# Patient Record
Sex: MALE | Race: WHITE | Employment: FULL TIME | URBAN - METROPOLITAN AREA
[De-identification: names, ages, dates, MRNs, and addresses within clinical notes are randomized per-mention and may not be internally consistent; named-entity substitution may affect disease eponyms.]

---

## 2019-11-14 ENCOUNTER — OFFICE VISIT (OUTPATIENT)
Dept: INTERNAL MEDICINE CLINIC | Age: 24
End: 2019-11-14

## 2019-11-14 VITALS
HEART RATE: 81 BPM | OXYGEN SATURATION: 99 % | HEIGHT: 67 IN | BODY MASS INDEX: 24.86 KG/M2 | DIASTOLIC BLOOD PRESSURE: 77 MMHG | TEMPERATURE: 97.9 F | WEIGHT: 158.4 LBS | SYSTOLIC BLOOD PRESSURE: 115 MMHG | RESPIRATION RATE: 18 BRPM

## 2019-11-14 DIAGNOSIS — R07.9 CHEST PAIN, UNSPECIFIED TYPE: Primary | ICD-10-CM

## 2019-11-14 DIAGNOSIS — M54.9 BACK PAIN, UNSPECIFIED BACK LOCATION, UNSPECIFIED BACK PAIN LATERALITY, UNSPECIFIED CHRONICITY: ICD-10-CM

## 2019-11-14 DIAGNOSIS — M79.10 MYALGIA: ICD-10-CM

## 2019-11-14 RX ORDER — IBUPROFEN 600 MG/1
600 TABLET ORAL
Qty: 20 TAB | Refills: 0 | Status: SHIPPED | OUTPATIENT
Start: 2019-11-14

## 2019-11-14 NOTE — PROGRESS NOTES
Chief Complaint   Patient presents with    Anxiety    Chest Pain     1. Have you been to the ER, urgent care clinic since your last visit? Hospitalized since your last visit? No    2. Have you seen or consulted any other health care providers outside of the [address] [place] [address] since your last visit? Include any pap smears or colon screening.  No

## 2019-11-14 NOTE — PROGRESS NOTES
[facility]  Manoj Jennings MD  [address] [address]    Chief Complaint   Patient presents with    Anxiety    Chest Pain       SUBJECTIVE:    Juancho Whitt is a [employer] y.o. male with PMH of Asperger's syndrome, ADHD, depression and acne vulgaris in with mother for chest pain evaluation. He is a new patient. Obtaining history is a challenge but for less than 1 week pt has had anterior chest pain, vague in description but non- exertional, no associated dyspnea, diaphoresis or nausea. Also has nonspecific upper and lower back pain. Denies fatigue, weakness or paresthesia. . No injury history. Omeprazole did not relieve. Has not tried other treatment. Endorses a high satuared fat diet. Works at [employer] and pushing heavy objects all day. Psych: Nicholas Crystal          Current Outpatient Medications   Medication Sig Dispense Refill    bupropion HCl (WELLBUTRIN PO) Take  by mouth.          Past Medical History:   Diagnosis Date    Asperger's syndrome      History reviewed. No pertinent surgical history.   No Known Allergies    REVIEW OF SYSTEMS:  General: negative for - chills or fever  ENT: negative for - headaches, nasal congestion or tinnitus  Respiratory: negative for - cough, hemoptysis, shortness of breath or wheezing  Cardiovascular : + chest pain; negative for -  edema, palpitations or shortness of breath  Gastrointestinal: negative for - abdominal pain, blood in stools, heartburn or nausea/vomiting  Genito-Urinary: no dysuria, trouble voiding, or hematuria  Musculoskeletal: negative for - gait disturbance, joint pain, joint stiffness or joint swelling  Neurological: no TIA or stroke symptoms  Hematologic: no bruises, no bleeding, no swollen glands  Integument: no lumps, mole changes, nail changes or rash  Endocrine:no malaise/lethargy or unexpected weight changes      Social History     Socioeconomic History    Marital status: SINGLE     Spouse name: Not on file    Number of children: Not on file    Years of education: Not on file    Highest education level: Not on file   Tobacco Use    Smoking status: Never Smoker    Smokeless tobacco: Never Used   Substance and Sexual Activity    Alcohol use: Never     Frequency: Never    Drug use: Never     History reviewed. No pertinent family history. OBJECTIVE:     Visit Vitals  /77   Pulse 81   Temp 97.9 °F (36.6 °C) (Oral)   Resp 18   Ht 5' 7\" (1.702 m)   Wt 158 lb 6.4 oz (71.8 kg)   SpO2 99%   BMI 24.81 kg/m²     CONSTITUTIONAL: well developed, well nourished, appears age appropriate  EYES: perrla, eom intact  ENMT:moist mucous membranes, pharynx clear  NECK: supple. Thyroid normal  RESPIRATORY: Chest: clear bilaterally  CARDIOVASCULAR: Heart: regular rate and rhythm; no chest wall tenderness  GASTROINTESTINAL: Abdomen: soft, bowel sounds active  HEMATOLOGIC: no pathological lymph nodes palpated  MUSCULOSKELETAL: Back: no spinal or paraspinal tenderness. ROM is full at neck and extremities. Extremities: no edema, pulse 1+   INTEGUMENT: Facial comedonesl. NEUROLOGIC: non-focal exam SLR test negative DTR intact  MENTAL STATUS: alert and oriented, appropriate affect     Office Visit on 11/14/2019   Component Date Value Ref Range Status    WBC 11/14/2019 7.9  3.4 - 10.8 x10E3/uL Final    RBC 11/14/2019 5.38  4.14 - 5.80 x10E6/uL Final    HGB 11/14/2019 16.0  13.0 - 17.7 g/dL Final    HCT 11/14/2019 46.6  37.5 - 51.0 % Final    MCV 11/14/2019 87  79 - 97 fL Final    MCH 11/14/2019 29.7  26.6 - 33.0 pg Final    MCHC 11/14/2019 34.3  31.5 - 35.7 g/dL Final    RDW 11/14/2019 14.0  12.3 - 15.4 % Final    PLATELET 64/22/8118 671  150 - 450 x10E3/uL Final    NEUTROPHILS 11/14/2019 77  Not Estab. % Final    Lymphocytes 11/14/2019 14  Not Estab. % Final    MONOCYTES 11/14/2019 7  Not Estab. % Final    EOSINOPHILS 11/14/2019 2  Not Estab. % Final    BASOPHILS 11/14/2019 0  Not Estab. % Final    ABS.  NEUTROPHILS 11/14/2019 6.1  1.4 - 7.0 x10E3/uL Final    Abs Lymphocytes 11/14/2019 1.1  0.7 - 3.1 x10E3/uL Final    ABS. MONOCYTES 11/14/2019 0.5  0.1 - 0.9 x10E3/uL Final    ABS. EOSINOPHILS 11/14/2019 0.2  0.0 - 0.4 x10E3/uL Final    ABS. BASOPHILS 11/14/2019 0.0  0.0 - 0.2 x10E3/uL Final    IMMATURE GRANULOCYTES 11/14/2019 0  Not Estab. % Final    ABS. IMM. GRANS. 11/14/2019 0.0  0.0 - 0.1 x10E3/uL Final    Glucose 11/14/2019 79  65 - 99 mg/dL Final    BUN 11/14/2019 15  6 - 20 mg/dL Final    Creatinine 11/14/2019 1.13  0.76 - 1.27 mg/dL Final    GFR est non-AA 11/14/2019 90  >59 mL/min/1.73 Final    GFR est AA 11/14/2019 105  >59 mL/min/1.73 Final    BUN/Creatinine ratio 11/14/2019 13  9 - 20 Final    Sodium 11/14/2019 142  134 - 144 mmol/L Final    Potassium 11/14/2019 4.7  3.5 - 5.2 mmol/L Final    Chloride 11/14/2019 100  96 - 106 mmol/L Final    CO2 11/14/2019 21  20 - 29 mmol/L Final    Calcium 11/14/2019 10.0  8.7 - 10.2 mg/dL Final    Protein, total 11/14/2019 7.3  6.0 - 8.5 g/dL Final    Albumin 11/14/2019 5.0  3.5 - 5.5 g/dL Final    GLOBULIN, TOTAL 11/14/2019 2.3  1.5 - 4.5 g/dL Final    A-G Ratio 11/14/2019 2.2  1.2 - 2.2 Final    Bilirubin, total 11/14/2019 1.7* 0.0 - 1.2 mg/dL Final    Alk.  phosphatase 11/14/2019 76  39 - 117 IU/L Final    AST (SGOT) 11/14/2019 21  0 - 40 IU/L Final    ALT (SGPT) 11/14/2019 15  0 - 44 IU/L Final    TSH 11/14/2019 2.390  0.450 - 4.500 uIU/mL Final    Cholesterol, total 11/14/2019 161  100 - 199 mg/dL Final    Triglyceride 11/14/2019 56  0 - 149 mg/dL Final    HDL Cholesterol 11/14/2019 49  >39 mg/dL Final    VLDL, calculated 11/14/2019 11  5 - 40 mg/dL Final    LDL, calculated 11/14/2019 101* 0 - 99 mg/dL Final    Specific Gravity 11/14/2019 1.023  1.005 - 1.030 Final    pH (UA) 11/14/2019 5.5  5.0 - 7.5 Final    Color 11/14/2019 Yellow  Yellow Final    Appearance 11/14/2019 Clear  Clear Final    Leukocyte Esterase 11/14/2019 Negative Negative Final    Protein 11/14/2019 Negative  Negative/Trace Final    Glucose 11/14/2019 Negative  Negative Final    Ketone 11/14/2019 Trace* Negative Final    Blood 11/14/2019 Negative  Negative Final    Bilirubin 11/14/2019 Negative  Negative Final    Urobilinogen 11/14/2019 0.2  0.2 - 1.0 mg/dL Final    Nitrites 11/14/2019 Negative  Negative Final    Microscopic Examination 11/14/2019 Comment   Final    Microscopic not indicated and not performed. ASSESSMENT:   1. Chest pain, unspecified type    2. Back pain, unspecified back location, unspecified back pain laterality, unspecified chronicity    3. I have discussed the diagnosis with the patient and the intended plan as seen in the  orders above. The patient understands and agees with the plan. The patient has   received an after visit summary and questions were answered concerning  future plans  Patient labs and/or xrays were reviewed  Past records were reviewed. PLAN:  .  Orders Placed This Encounter    XR SPINE THORAC 3 V    XR SPINE LUMB 2 OR 3 V    CBC WITH AUTOMATED DIFF    METABOLIC PANEL, COMPREHENSIVE    TSH REFLEX TO T4    LIPID PANEL    URINALYSIS W/ RFLX MICROSCOPIC    AMB POC EKG ROUTINE W/ 12 LEADS, INTER & REP    bupropion HCl (WELLBUTRIN PO)- pt will continue this chronic medication    ibuprofen (MOTRIN) 600 mg tablet qid prn #20     Counseled regarding diet, exercise and healthy lifestyle  Medication Side Effects and Warnings were discussed with patient,  Patient Labs were reviewed and or requested, and  Patient Past Records were reviewed and or requested  yes      A total of at least 30 min was spent during this evaluation of which half was spent in counseling and care coordination.

## 2019-11-14 NOTE — LETTER
11/14/2019 3:31 PM 
 
Mr. Ever Lawler [address] [facility] [address] Dear Ever Lawler: 
 
Please find your most recent results below. NORMAL X-RAY OF THORACIC SPINE Resulted Orders XR SPINE [facility] 39 3 V (Exam End: 11/14/2019 12:38 PM) Narrative INDICATION:  back pain Exam: AP, lateral and swimmers views of the thoracic spine. FINDINGS: There is no acute fracture or subluxation. Bones are well-mineralized. Intervertebral discs are well maintained. Impression IMPRESSION: No acute fracture or subluxation. Please call me if you have any questions: [phone] Sincerely, Mitch Dickson MD

## 2019-11-14 NOTE — LETTER
11/14/2019 3:32 PM 
 
Mr. Josie Gutiérrez [address] [facility] [address] Dear Josie Gutiérrez: 
 
Please find your most recent results below. NORMAL X-RAY OF LUMBAR SPINE Resulted Orders XR SPINE LUMB 2 OR 3 V (Exam End: 11/14/2019 12:38 PM) Narrative INDICATION:  back pain Exam: AP, lateral and cone-down views of the lumbar spine. FINDINGS: There is no acute fracture or subluxation. Intervertebral disc spaces 
are well maintained. Bones are well-mineralized. Soft tissues are normal. 
  
 Impression IMPRESSION: No acute fracture or subluxation. XR SPINE [address] 3 V (Exam End: 11/14/2019 12:38 PM) Narrative INDICATION:  back pain Exam: AP, lateral and swimmers views of the thoracic spine. FINDINGS: There is no acute fracture or subluxation. Bones are well-mineralized. Intervertebral discs are well maintained. Impression IMPRESSION: No acute fracture or subluxation. Please call me if you have any questions: [phone] Sincerely, Jaelyn Villagomez MD

## 2019-11-15 LAB
ALBUMIN SERPL-MCNC: 5 G/DL (ref 3.5–5.5)
ALBUMIN/GLOB SERPL: 2.2 {RATIO} (ref 1.2–2.2)
ALP SERPL-CCNC: 76 IU/L (ref 39–117)
ALT SERPL-CCNC: 15 IU/L (ref 0–44)
APPEARANCE UR: CLEAR
AST SERPL-CCNC: 21 IU/L (ref 0–40)
BASOPHILS # BLD AUTO: 0 X10E3/UL (ref 0–0.2)
BASOPHILS NFR BLD AUTO: 0 %
BILIRUB SERPL-MCNC: 1.7 MG/DL (ref 0–1.2)
BILIRUB UR QL STRIP: NEGATIVE
BUN SERPL-MCNC: 15 MG/DL (ref 6–20)
BUN/CREAT SERPL: 13 (ref 9–20)
CALCIUM SERPL-MCNC: 10 MG/DL (ref 8.7–10.2)
CHLORIDE SERPL-SCNC: 100 MMOL/L (ref 96–106)
CHOLEST SERPL-MCNC: 161 MG/DL (ref 100–199)
CO2 SERPL-SCNC: 21 MMOL/L (ref 20–29)
COLOR UR: YELLOW
CREAT SERPL-MCNC: 1.13 MG/DL (ref 0.76–1.27)
EOSINOPHIL # BLD AUTO: 0.2 X10E3/UL (ref 0–0.4)
EOSINOPHIL NFR BLD AUTO: 2 %
ERYTHROCYTE [DISTWIDTH] IN BLOOD BY AUTOMATED COUNT: 14 % (ref 12.3–15.4)
GLOBULIN SER CALC-MCNC: 2.3 G/DL (ref 1.5–4.5)
GLUCOSE SERPL-MCNC: 79 MG/DL (ref 65–99)
GLUCOSE UR QL: NEGATIVE
HCT VFR BLD AUTO: 46.6 % (ref 37.5–51)
HDLC SERPL-MCNC: 49 MG/DL
HGB BLD-MCNC: 16 G/DL (ref 13–17.7)
HGB UR QL STRIP: NEGATIVE
IMM GRANULOCYTES # BLD AUTO: 0 X10E3/UL (ref 0–0.1)
IMM GRANULOCYTES NFR BLD AUTO: 0 %
KETONES UR QL STRIP: ABNORMAL
LDLC SERPL CALC-MCNC: 101 MG/DL (ref 0–99)
LEUKOCYTE ESTERASE UR QL STRIP: NEGATIVE
LYMPHOCYTES # BLD AUTO: 1.1 X10E3/UL (ref 0.7–3.1)
LYMPHOCYTES NFR BLD AUTO: 14 %
MCH RBC QN AUTO: 29.7 PG (ref 26.6–33)
MCHC RBC AUTO-ENTMCNC: 34.3 G/DL (ref 31.5–35.7)
MCV RBC AUTO: 87 FL (ref 79–97)
MICRO URNS: ABNORMAL
MONOCYTES # BLD AUTO: 0.5 X10E3/UL (ref 0.1–0.9)
MONOCYTES NFR BLD AUTO: 7 %
NEUTROPHILS # BLD AUTO: 6.1 X10E3/UL (ref 1.4–7)
NEUTROPHILS NFR BLD AUTO: 77 %
NITRITE UR QL STRIP: NEGATIVE
PH UR STRIP: 5.5 [PH] (ref 5–7.5)
PLATELET # BLD AUTO: 321 X10E3/UL (ref 150–450)
POTASSIUM SERPL-SCNC: 4.7 MMOL/L (ref 3.5–5.2)
PROT SERPL-MCNC: 7.3 G/DL (ref 6–8.5)
PROT UR QL STRIP: NEGATIVE
RBC # BLD AUTO: 5.38 X10E6/UL (ref 4.14–5.8)
SODIUM SERPL-SCNC: 142 MMOL/L (ref 134–144)
SP GR UR: 1.02 (ref 1–1.03)
TRIGL SERPL-MCNC: 56 MG/DL (ref 0–149)
TSH SERPL DL<=0.005 MIU/L-ACNC: 2.39 UIU/ML (ref 0.45–4.5)
UROBILINOGEN UR STRIP-MCNC: 0.2 MG/DL (ref 0.2–1)
VLDLC SERPL CALC-MCNC: 11 MG/DL (ref 5–40)
WBC # BLD AUTO: 7.9 X10E3/UL (ref 3.4–10.8)

## 2020-10-20 ENCOUNTER — HOSPITAL ENCOUNTER (EMERGENCY)
Age: 25
Discharge: HOME | End: 2020-10-20
Attending: EMERGENCY MEDICINE
Payer: MEDICAID

## 2020-10-20 VITALS
WEIGHT: 163.58 LBS | TEMPERATURE: 98.5 F | BODY MASS INDEX: 26.29 KG/M2 | DIASTOLIC BLOOD PRESSURE: 92 MMHG | HEIGHT: 66 IN | SYSTOLIC BLOOD PRESSURE: 127 MMHG | HEART RATE: 100 BPM | RESPIRATION RATE: 16 BRPM | OXYGEN SATURATION: 99 %

## 2020-10-20 DIAGNOSIS — S00.93XA CONTUSION OF HEAD, UNSPECIFIED PART OF HEAD, INITIAL ENCOUNTER: ICD-10-CM

## 2020-10-20 DIAGNOSIS — S09.90XA CLOSED HEAD INJURY, INITIAL ENCOUNTER: Primary | ICD-10-CM
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
## 2020-10-20 PROCEDURE — 74011250636 HC RX REV CODE- 250/636: Performed by: EMERGENCY MEDICINE

## 2020-10-20 PROCEDURE — 96372 THER/PROPH/DIAG INJ SC/IM: CPT

## 2020-10-20 PROCEDURE — 99283 EMERGENCY DEPT VISIT LOW MDM: CPT

## 2020-10-20 RX ORDER — KETOROLAC TROMETHAMINE 30 MG/ML
30 INJECTION, SOLUTION INTRAMUSCULAR; INTRAVENOUS
Status: COMPLETED | OUTPATIENT
Start: 2020-10-20 | End: 2020-10-20

## 2020-10-20 RX ADMIN — KETOROLAC TROMETHAMINE 30 MG: 30 INJECTION, SOLUTION INTRAMUSCULAR at 19:34
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
## 2020-10-20 NOTE — ED NOTES
Bedside and Verbal report given to Yodit Virgen RN (oncoming nurse) by Ida Erwin RN (offgoing nurse). Report included the following information SBAR, ED Summary, MAR and Recent Results.
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
## 2020-10-20 NOTE — DISCHARGE INSTRUCTIONS
639 [facility],  [address] Providers    Accepts Insured Patients Only:  [facility]  [address]       [phone]   Near the corner of [facility] and [address] in the near Counts include 234 beds at the [facility]. Accepts most insurance including Medicaid/Medicare. No psychiatry. On the [facility] bus line. [phone]  [place] ([address]  [address] 30 [facility], [address] [place])     [phone]   Accepts most major insurances. Psychiatry available. Some DBT groups. [facility])    [phone]   Mixture of psychologists and psychiatrists. They do not accept Medicaid or Medicare. The [facility]  [facility]       [phone]   Mixture of clinical social workers and psychologists. Sliding Scale/Financial Aid/Differing Payment Options  [facility]  975 [facility])      [phone]   Our own Alberta Cole and Yoanna Jarvis. Variety of treatment options, including DBT. [address]       [phone]   Provides a variety of group and individual counseling options. Insurance, Medicaid, Medicare and sliding scale      Medicaid/Medicare providers in the [address] area  [address] [place], [place] [address]       [phone]    [facility]         [address]       [phone]    [place]  [address], [address]    [phone] ex.  751 [facility]     [phone]    [address] [place]      [phone]      Services for patients without Medicaid, [place] or Insurance  The Daily Yoel CHASE/Dio Miles [facility] [phone]  MD Demetria Vu MD Emmanuel Smalling, MD [facility] Doctors [phone]  Lalo Rojas, [facility]  MD Kip Rocha, MD Juan Mcintyre Johanna Zimmermans 83 [phone]  Perry Spangler, MD Gregor Murdock MD [address] [facility] [phone]  Manoj Partida, MD Pernell Fernandes MD Marcey Sickles, MD   [facility] [phone]  [facility] FP, MD Dontrell Howell, MD [facility], NP [address] [facility] [address] [phone]  MD Cynthia Thomson, MD Evelyn Gomes, MD Chase Jones, MD Gareth Treviño, MD William Castillo, MD Angi Deluca MD   33 57 [facility]  Karina Damon MD [facility] [phone]  Noni Goltz, MD Rachel Villalpando, NP  Erica Clark, MD April Causey, MD Juancho Morales MD   2138 [facility] [phone]  MD Hiral Carrera, P  Kenneth Antis, NP  Robinson French, MD Eliezer Fisher MD Diedre Nightingale, MD [facility] [phone]  MD Rebecca Mayes MD Atlee Gill, MD Christianne Anton, MD Gwynneth Butler, MD   [address] [phone]  MD Joni Cox MD [place] [phone]  Alecia Boas, MD Cathi Clap, MD Sharee Bi, MD   [facility] [phone]  MD Christina Rivera MD Shedrick Sheng, MD Alcario Hughes, MD Arva Hall, MD Ivett Mercado, NP  Polo Do MD 2218  66   [phone]  MD Phoebe Will, MD Eber Hutchinson MD   [address] [facility] [address] [phone]  Nighat Atwood, MD Glenda Lindsey, KAYLIP  Adan Villalba, PRINCESS Villalba, FNP  Jazmín Solis, MD Meghan Arizmendi, ORTEGA Urena DO Everardo             Miscellaneous:  Opal Almanza MD [facility] Departments     For adult and child immunizations, family planning, TB screening, STD testing and women's health services. [facility]: [place]na [phone]      [address]erald[address] 130 [facility] 1822   [facility]   729 [facility] [address] [phone]      [address]          [address] [place] [address]     For primary care services, woman and child wellness, and some clinics providing specialty care. [facility] -- [address] 2525 [facility] [phone]   411 [facility] 200 [facility] 3614 [facility] [phone]   339 [facility] [address] [phone]   [address] [facility] [address] [place] [address] 5850  Community  [phone] [address] [place] [address] [address] [place] [phone]   [facility] 81 [facility] [phone]   [address] [phone]   [facility]: [facility] [address] [place] [address], [address] [phone]     [address] [phone]   [facility] 5850  Community  [phone]   [facility]  [address], [address] [address] ([email or web address]) [phone]         Sexual Health/Woman Wellness Clinics    For STD/HIV testing and treatment, pregnancy testing and services, men's health, birth control services, LGBT services, and hepatitis/HPV vaccine services. [place] & Alberto for [place] [employer] [address] [facility] [address] [facility] [address] [facility] 1579 [address] [phone]   [facility] [address], 5th floor [phone]   Pregnancy [place] [address] Danette Salgado [address] for Women [address] Shojuan manuel [phone] 8260 [facility] 401 [facility],[address]   [phone]   [place]   [phone]   Women, Infant and Children's Services: Bridget  [phone]       [address] [phone]   [facility]   [phone]   [facility] [place]   [phone]   6157 [facility] Psychiatry     [phone]   [address] [place] [address]   [phone]   6 [facility] [phone]       You were seen in the ER for your symptoms. Please take motrin and tylenol as needed for pain, you can also use ice for the area. Return for worsening symptoms at any time.

## 2020-10-21 NOTE — ED NOTES
Pt discharged by [facility]. Pt provided with discharge instructions RX and instructions on follow up care. Pt out of ED via wheelchair accompanied by RN.

## 2023-02-27 ENCOUNTER — HOSPITAL ENCOUNTER (EMERGENCY)
Age: 28
Discharge: HOME | End: 2023-02-27
Attending: EMERGENCY MEDICINE
Payer: COMMERCIAL

## 2023-02-27 VITALS
TEMPERATURE: 97.8 F | OXYGEN SATURATION: 96 % | HEART RATE: 103 BPM | DIASTOLIC BLOOD PRESSURE: 87 MMHG | RESPIRATION RATE: 18 BRPM | SYSTOLIC BLOOD PRESSURE: 126 MMHG

## 2023-02-27 DIAGNOSIS — T88.7XXA NON-DOSE-RELATED ADVERSE EFFECT OF MEDICATION, INITIAL ENCOUNTER: Primary | ICD-10-CM

## 2023-02-27 PROCEDURE — 99282 EMERGENCY DEPT VISIT SF MDM: CPT

## 2023-02-27 NOTE — ED TRIAGE NOTES
TRIAGE: Pt arrives after he reports he accidentally took one Latuda this morning. +drowsiness. Pt arrives with coworker who says he laid down in the conference room on the floor. Denies N/V/D. Hx of parker.

## 2023-02-27 NOTE — DISCHARGE INSTRUCTIONS
Please go home rest, increase your fluids to 8 ounces every hour that you are awake; including salty liquids such as soups, broths, seltzer water and Gatorade. Take your medications as prescribed.   Have close follow-up with your PCP if symptoms persist.

## 2023-02-27 NOTE — ED PROVIDER NOTES
Other  Pertinent negatives include no chest pain, no abdominal pain, no headaches and no shortness of breath. Patient is a 43-year-old male with past medical history significant for Aschenbrenner syndrome who presents to the ED accompanied by his sister and grandmother reporting that he thinks he may have taken his Latuda this morning instead of his Vyvanse this morning before heading to work. He states he drove to work without difficulty and did not feel drowsy. After at work for an hour starting his day he felt more sleepy than normal.  His boss recommended that he get evaluated. Denies fever, cold symptoms, headache,neck pain, visual changes, focal weakness or rash. Denies any difficulty breathing, difficulty swallowing, SOB or chest pain. Denies any nausea, vomiting or diarrhea. Pt. has his previously prescribed medication bottles with him that appear to contain within a 1 to 2-day supply of all the medicines as prescribed. Hand-eye coordination is intact; normal reflexes; normal gait. Patient is answering questions appropriately; he is oriented to person place and time. Old charts reviewed. Past Medical History:   Diagnosis Date    Asperger's syndrome        History reviewed. No pertinent surgical history. History reviewed. No pertinent family history.     Social History     Socioeconomic History    Marital status: SINGLE     Spouse name: Not on file    Number of children: Not on file    Years of education: Not on file    Highest education level: Not on file   Occupational History    Not on file   Tobacco Use    Smoking status: Never    Smokeless tobacco: Never   Substance and Sexual Activity    Alcohol use: Yes     Comment: socially    Drug use: Never    Sexual activity: Not on file   Other Topics Concern    Not on file   Social History Narrative    Not on file     Social Determinants of Health     Financial Resource Strain: Not on file   Food Insecurity: Not on file   Transportation Needs: Not on file   Physical Activity: Not on file   Stress: Not on file   Social Connections: Not on file   Intimate Partner Violence: Not on file   Housing Stability: Not on file         ALLERGIES: Patient has no known allergies. Review of Systems   Constitutional:  Negative for activity change, appetite change, fatigue, fever and unexpected weight change. HENT:  Negative for congestion, rhinorrhea and sore throat. Eyes:  Negative for visual disturbance. Respiratory:  Negative for cough, choking and shortness of breath. Cardiovascular:  Negative for chest pain, palpitations and leg swelling. Gastrointestinal:  Negative for abdominal pain, diarrhea, nausea and vomiting. Genitourinary:  Negative for dysuria and flank pain. Musculoskeletal:  Negative for back pain, gait problem and neck pain. Skin:  Negative for rash. Neurological:  Negative for dizziness, light-headedness and headaches. All other systems reviewed and are negative. Vitals:    02/27/23 1130   BP: 126/87   Pulse: (!) 103   Resp: 18   Temp: 97.8 °F (36.6 °C)   SpO2: 96%            Physical Exam  Vitals and nursing note reviewed. Constitutional:       General: He is not in acute distress. Appearance: Normal appearance. He is not ill-appearing, toxic-appearing or diaphoretic. 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 Right Ear: Tympanic membrane normal.      Left Ear: Tympanic membrane normal.      Nose: Nose normal.      Mouth/Throat:      Mouth: Mucous membranes are moist.      Pharynx: No posterior oropharyngeal erythema. Eyes:      Extraocular Movements: Extraocular movements intact. Conjunctiva/sclera: Conjunctivae normal.      Pupils: Pupils are equal, round, and reactive to light. Comments: No nystagmus   Cardiovascular:      Rate and Rhythm: Normal rate and regular rhythm.    Pulmonary:      Effort: Pulmonary effort is normal.      Breath sounds: Normal breath sounds. Abdominal:      General: Bowel sounds are normal.   Musculoskeletal:         General: No swelling, tenderness, deformity or signs of injury. Normal range of motion. Cervical back: Normal range of motion and neck supple. No tenderness. Right lower leg: No edema. Left lower leg: No edema. Lymphadenopathy:      Cervical: No cervical adenopathy. Skin:     General: Skin is warm and dry. Findings: No bruising, erythema or rash. Neurological:      General: No focal deficit present. Mental Status: He is alert and oriented to person, place, and time. Medical Decision Making         Procedures    Patient appears to be at his normal baseline. His vitals are stable; no focal deficit noted. Recommend going home to rest. .  We reviewed safe methods of keeping track of medications daily. Close follow-up with PCP if any abnormal symptoms persist.      Patient's results  and plan of care have been reviewed with the patient and his family members present. Patient and/or family have verbally conveyed their understanding and agreement of the patient's signs, symptoms, diagnosis, treatment and prognosis and additionally agree to follow up as recommended or return to the Emergency Room should his condition change prior to follow-up. Discharge instructions have also been provided to the patient with some educational information regarding his diagnosis as well a list of reasons why he would want to return to the ER prior to his follow-up appointment should his condition change. Cuong Gillis NP  Discussed plan of care with Dr. Lisandro Lopez.  Cuong Gillis NP

## 2023-02-27 NOTE — Clinical Note
[address]  2450 [facility] [address]  [phone]    Work/School Note    Date: 2/27/2023    To Whom It May concern:      Wing Berger was seen and treated today in the emergency room by the following provider(s):  Attending Provider: Errol Esqueda MD  Nurse Practitioner: Cem Muñiz NP. Wing Berger is excused from work/school on 02/27/23. He is clear to return to work/school on 02/28/23.         Sincerely,          Saige Atkins NP

## 2023-05-19 RX ORDER — IBUPROFEN 600 MG/1
600 TABLET ORAL EVERY 6 HOURS PRN
COMMUNITY
Start: 2019-11-14

## 2023-07-02 ENCOUNTER — HOSPITAL ENCOUNTER (EMERGENCY)
Facility: HOSPITAL | Age: 28
Discharge: HOME | End: 2023-07-02
Attending: EMERGENCY MEDICINE
Payer: COMMERCIAL

## 2023-07-02 VITALS
HEART RATE: 109 BPM | WEIGHT: 171.96 LBS | HEIGHT: 67 IN | SYSTOLIC BLOOD PRESSURE: 117 MMHG | TEMPERATURE: 98.4 F | DIASTOLIC BLOOD PRESSURE: 69 MMHG | BODY MASS INDEX: 26.99 KG/M2 | OXYGEN SATURATION: 95 % | RESPIRATION RATE: 16 BRPM

## 2023-07-02 DIAGNOSIS — H66.90 ACUTE OTITIS MEDIA, UNSPECIFIED OTITIS MEDIA TYPE: Primary | ICD-10-CM

## 2023-07-02 DIAGNOSIS — H61.23 BILATERAL IMPACTED CERUMEN: ICD-10-CM

## 2023-07-02 PROCEDURE — 6370000000 HC RX 637 (ALT 250 FOR IP): Performed by: NURSE PRACTITIONER

## 2023-07-02 PROCEDURE — 69209 REMOVE IMPACTED EAR WAX UNI: CPT

## 2023-07-02 PROCEDURE — 99283 EMERGENCY DEPT VISIT LOW MDM: CPT

## 2023-07-02 RX ORDER — AMOXICILLIN 500 MG/1
500 CAPSULE ORAL 2 TIMES DAILY
Qty: 20 CAPSULE | Refills: 0 | Status: SHIPPED | OUTPATIENT
Start: 2023-07-02 | End: 2023-07-12

## 2023-07-02 RX ADMIN — DOCUSATE SODIUM LIQUID 100 MG: 100 LIQUID ORAL at 16:31

## 2023-07-02 ASSESSMENT — ENCOUNTER SYMPTOMS
SORE THROAT: 0
COUGH: 0
ABDOMINAL PAIN: 0
SHORTNESS OF BREATH: 0

## 2023-09-09 ENCOUNTER — HOSPITAL ENCOUNTER (EMERGENCY)
Facility: HOSPITAL | Age: 28
Discharge: HOME | End: 2023-09-09
Attending: EMERGENCY MEDICINE
Payer: COMMERCIAL

## 2023-09-09 VITALS
TEMPERATURE: 98 F | OXYGEN SATURATION: 97 % | HEART RATE: 100 BPM | WEIGHT: 166.89 LBS | DIASTOLIC BLOOD PRESSURE: 85 MMHG | BODY MASS INDEX: 26.14 KG/M2 | SYSTOLIC BLOOD PRESSURE: 135 MMHG | RESPIRATION RATE: 16 BRPM

## 2023-09-09 DIAGNOSIS — R21 RASH AND OTHER NONSPECIFIC SKIN ERUPTION: Primary | ICD-10-CM

## 2023-09-09 PROCEDURE — 99283 EMERGENCY DEPT VISIT LOW MDM: CPT

## 2023-09-09 RX ORDER — DOXYCYCLINE HYCLATE 100 MG
100 TABLET ORAL 2 TIMES DAILY
Qty: 60 TABLET | Refills: 0 | Status: SHIPPED | OUTPATIENT
Start: 2023-09-09 | End: 2023-10-09

## 2023-09-09 ASSESSMENT — ENCOUNTER SYMPTOMS
RESPIRATORY NEGATIVE: 1
GASTROINTESTINAL NEGATIVE: 1

## 2023-09-09 ASSESSMENT — PAIN - FUNCTIONAL ASSESSMENT: PAIN_FUNCTIONAL_ASSESSMENT: NONE - DENIES PAIN

## 2023-09-09 NOTE — ED PROVIDER NOTES
[facility] EMERGENCY DEP  EMERGENCY DEPARTMENT ENCOUNTER      Pt Name: Sonny Pratt  MRN: [id number]  [address] [facility] 1995  Date of evaluation: 9/9/2023  Provider: MARQUITA Greer - MAVIS    CHIEF COMPLAINT     No chief complaint on file. HISTORY OF PRESENT ILLNESS   (Location/Symptom, Timing/Onset, Context/Setting, Quality, Duration, Modifying Factors, Severity)  Note limiting factors. 30 y/o male HX Aspergers presents ambulatory to the ER for evaluation of Staph infection, treating with Chau and Comfrey, restarted Vivance- stimulant  Right forearm, left forearm, right forearm, upper back and right lower leg. Patient noted to be picking at wounds, some are covered with Band-Aids, no active drainage, patient denies that they feel itchy or hurt. Patient denies any fever, chills, no other complaints. Denies change in soaps, medication, food or outdoor exposure. The history is provided by the patient. Review of External Medical Records:     Nursing Notes were reviewed. REVIEW OF SYSTEMS    (2-9 systems for level 4, 10 or more for level 5)     Review of Systems   Constitutional: Negative. Respiratory: Negative. Cardiovascular: Negative. Gastrointestinal: Negative. Genitourinary: Negative. Musculoskeletal: Negative. Skin:  Positive for wound. Scattered sores all over the body, with scabs, right and left forearm, upper back and right lower legs. Neurological: Negative. Except as noted above the remainder of the review of systems was reviewed and negative. PAST MEDICAL HISTORY     Past Medical History:   Diagnosis Date    Asperger's syndrome          SURGICAL HISTORY     No past surgical history on file. CURRENT MEDICATIONS       Previous Medications    IBUPROFEN (ADVIL;MOTRIN) 600 MG TABLET    Take 1 tablet by mouth every 6 hours as needed       ALLERGIES     Patient has no known allergies. FAMILY HISTORY     No family history on file.

## 2023-09-09 NOTE — ED TRIAGE NOTES
Patient states he is here for a staph infection. He denies a prior diagnosis but states his leg started hurting this morning and  he has wounds and infected spots on his arms and back.

## 2023-09-09 NOTE — DISCHARGE INSTRUCTIONS
Harriet temp showers. Please call and schedule a follow-up appointment with dermatology. Consider eating 1 serving of yogurt daily while taking the antibiotics, and take the full 30-day course. Please follow-up with your PCP in between while waiting for the dermatology appointment.